# Patient Record
Sex: MALE | Race: BLACK OR AFRICAN AMERICAN | Employment: UNEMPLOYED | URBAN - METROPOLITAN AREA
[De-identification: names, ages, dates, MRNs, and addresses within clinical notes are randomized per-mention and may not be internally consistent; named-entity substitution may affect disease eponyms.]

---

## 2024-07-18 ENCOUNTER — APPOINTMENT (OUTPATIENT)
Dept: GENERAL RADIOLOGY | Age: 39
DRG: 069 | End: 2024-07-18
Payer: COMMERCIAL

## 2024-07-18 ENCOUNTER — HOSPITAL ENCOUNTER (INPATIENT)
Age: 39
LOS: 1 days | Discharge: HOME | DRG: 069 | End: 2024-07-19
Attending: INTERNAL MEDICINE | Admitting: INTERNAL MEDICINE
Payer: COMMERCIAL

## 2024-07-18 ENCOUNTER — APPOINTMENT (OUTPATIENT)
Dept: CT IMAGING | Age: 39
DRG: 069 | End: 2024-07-18
Payer: COMMERCIAL

## 2024-07-18 ENCOUNTER — APPOINTMENT (OUTPATIENT)
Dept: MRI IMAGING | Age: 39
DRG: 069 | End: 2024-07-18
Payer: COMMERCIAL

## 2024-07-18 DIAGNOSIS — R06.02 SHORTNESS OF BREATH: ICD-10-CM

## 2024-07-18 DIAGNOSIS — R07.9 CHEST PAIN, UNSPECIFIED TYPE: ICD-10-CM

## 2024-07-18 DIAGNOSIS — R42 DIZZINESS: Primary | ICD-10-CM

## 2024-07-18 DIAGNOSIS — R29.6 FREQUENT FALLS: ICD-10-CM

## 2024-07-18 PROBLEM — E87.6 HYPOKALEMIA: Status: ACTIVE | Noted: 2024-07-18

## 2024-07-18 PROBLEM — G45.9 TIA (TRANSIENT ISCHEMIC ATTACK): Status: ACTIVE | Noted: 2024-07-18

## 2024-07-18 PROBLEM — I10 HTN (HYPERTENSION): Status: ACTIVE | Noted: 2024-07-18

## 2024-07-18 LAB
ALBUMIN SERPL-MCNC: 4 G/DL (ref 3.4–5)
ALBUMIN/GLOB SERPL: 1.1 {RATIO} (ref 1.1–2.2)
ALP SERPL-CCNC: 89 U/L (ref 40–129)
ALT SERPL-CCNC: 61 U/L (ref 10–40)
ANION GAP SERPL CALCULATED.3IONS-SCNC: 13 MMOL/L (ref 3–16)
AST SERPL-CCNC: 37 U/L (ref 15–37)
BACTERIA URNS QL MICRO: NORMAL /HPF
BASOPHILS # BLD: 0 K/UL (ref 0–0.2)
BASOPHILS NFR BLD: 0.9 %
BILIRUB SERPL-MCNC: 0.3 MG/DL (ref 0–1)
BILIRUB UR QL STRIP.AUTO: NEGATIVE
BUN SERPL-MCNC: 10 MG/DL (ref 7–20)
CALCIUM SERPL-MCNC: 9 MG/DL (ref 8.3–10.6)
CHLORIDE SERPL-SCNC: 99 MMOL/L (ref 99–110)
CLARITY UR: CLEAR
CO2 SERPL-SCNC: 25 MMOL/L (ref 21–32)
COLOR UR: YELLOW
CREAT SERPL-MCNC: 0.8 MG/DL (ref 0.9–1.3)
D-DIMER QUANTITATIVE: <0.27 UG/ML FEU (ref 0–0.6)
DEPRECATED RDW RBC AUTO: 14.4 % (ref 12.4–15.4)
EKG ATRIAL RATE: 79 BPM
EKG DIAGNOSIS: NORMAL
EKG P AXIS: 35 DEGREES
EKG P-R INTERVAL: 164 MS
EKG Q-T INTERVAL: 388 MS
EKG QRS DURATION: 100 MS
EKG QTC CALCULATION (BAZETT): 444 MS
EKG R AXIS: -4 DEGREES
EKG T AXIS: 16 DEGREES
EKG VENTRICULAR RATE: 79 BPM
EOSINOPHIL # BLD: 0.1 K/UL (ref 0–0.6)
EOSINOPHIL NFR BLD: 1.3 %
EPI CELLS #/AREA URNS AUTO: 0 /HPF (ref 0–5)
GFR SERPLBLD CREATININE-BSD FMLA CKD-EPI: >90 ML/MIN/{1.73_M2}
GLUCOSE SERPL-MCNC: 91 MG/DL (ref 70–99)
GLUCOSE UR STRIP.AUTO-MCNC: NEGATIVE MG/DL
HCT VFR BLD AUTO: 44.1 % (ref 40.5–52.5)
HGB BLD-MCNC: 14.2 G/DL (ref 13.5–17.5)
HGB UR QL STRIP.AUTO: NEGATIVE
HYALINE CASTS #/AREA URNS AUTO: 0 /LPF (ref 0–8)
KETONES UR STRIP.AUTO-MCNC: NEGATIVE MG/DL
LEUKOCYTE ESTERASE UR QL STRIP.AUTO: NEGATIVE
LYMPHOCYTES # BLD: 1.3 K/UL (ref 1–5.1)
LYMPHOCYTES NFR BLD: 24.7 %
MCH RBC QN AUTO: 27.9 PG (ref 26–34)
MCHC RBC AUTO-ENTMCNC: 32.3 G/DL (ref 31–36)
MCV RBC AUTO: 86.3 FL (ref 80–100)
MONOCYTES # BLD: 0.6 K/UL (ref 0–1.3)
MONOCYTES NFR BLD: 12.1 %
NEUTROPHILS # BLD: 3.1 K/UL (ref 1.7–7.7)
NEUTROPHILS NFR BLD: 61 %
NITRITE UR QL STRIP.AUTO: NEGATIVE
NT-PROBNP SERPL-MCNC: <36 PG/ML (ref 0–124)
PH UR STRIP.AUTO: 7.5 [PH] (ref 5–8)
PLATELET # BLD AUTO: 241 K/UL (ref 135–450)
PMV BLD AUTO: 8.3 FL (ref 5–10.5)
POTASSIUM SERPL-SCNC: 3.4 MMOL/L (ref 3.5–5.1)
PROT SERPL-MCNC: 7.7 G/DL (ref 6.4–8.2)
PROT UR STRIP.AUTO-MCNC: ABNORMAL MG/DL
RBC # BLD AUTO: 5.11 M/UL (ref 4.2–5.9)
RBC CLUMPS #/AREA URNS AUTO: 2 /HPF (ref 0–4)
SODIUM SERPL-SCNC: 137 MMOL/L (ref 136–145)
SP GR UR STRIP.AUTO: >=1.03 (ref 1–1.03)
TROPONIN, HIGH SENSITIVITY: <6 NG/L (ref 0–22)
TROPONIN, HIGH SENSITIVITY: <6 NG/L (ref 0–22)
UA COMPLETE W REFLEX CULTURE PNL UR: ABNORMAL
UA DIPSTICK W REFLEX MICRO PNL UR: YES
URN SPEC COLLECT METH UR: ABNORMAL
UROBILINOGEN UR STRIP-ACNC: 1 E.U./DL
WBC # BLD AUTO: 5.1 K/UL (ref 4–11)
WBC #/AREA URNS AUTO: 0 /HPF (ref 0–5)

## 2024-07-18 PROCEDURE — 99285 EMERGENCY DEPT VISIT HI MDM: CPT

## 2024-07-18 PROCEDURE — 80053 COMPREHEN METABOLIC PANEL: CPT

## 2024-07-18 PROCEDURE — 85025 COMPLETE CBC W/AUTO DIFF WBC: CPT

## 2024-07-18 PROCEDURE — 93010 ELECTROCARDIOGRAM REPORT: CPT | Performed by: INTERNAL MEDICINE

## 2024-07-18 PROCEDURE — 84484 ASSAY OF TROPONIN QUANT: CPT

## 2024-07-18 PROCEDURE — G0378 HOSPITAL OBSERVATION PER HR: HCPCS

## 2024-07-18 PROCEDURE — 6370000000 HC RX 637 (ALT 250 FOR IP): Performed by: INTERNAL MEDICINE

## 2024-07-18 PROCEDURE — 70496 CT ANGIOGRAPHY HEAD: CPT

## 2024-07-18 PROCEDURE — 70498 CT ANGIOGRAPHY NECK: CPT

## 2024-07-18 PROCEDURE — 2140000000 HC CCU INTERMEDIATE R&B

## 2024-07-18 PROCEDURE — 70551 MRI BRAIN STEM W/O DYE: CPT

## 2024-07-18 PROCEDURE — 71045 X-RAY EXAM CHEST 1 VIEW: CPT

## 2024-07-18 PROCEDURE — 85379 FIBRIN DEGRADATION QUANT: CPT

## 2024-07-18 PROCEDURE — 93005 ELECTROCARDIOGRAM TRACING: CPT | Performed by: INTERNAL MEDICINE

## 2024-07-18 PROCEDURE — 81001 URINALYSIS AUTO W/SCOPE: CPT

## 2024-07-18 PROCEDURE — 70450 CT HEAD/BRAIN W/O DYE: CPT

## 2024-07-18 PROCEDURE — 2580000003 HC RX 258: Performed by: INTERNAL MEDICINE

## 2024-07-18 PROCEDURE — 6360000004 HC RX CONTRAST MEDICATION: Performed by: PHYSICIAN ASSISTANT

## 2024-07-18 PROCEDURE — 83880 ASSAY OF NATRIURETIC PEPTIDE: CPT

## 2024-07-18 RX ORDER — 0.9 % SODIUM CHLORIDE 0.9 %
500 INTRAVENOUS SOLUTION INTRAVENOUS PRN
Status: DISCONTINUED | OUTPATIENT
Start: 2024-07-18 | End: 2024-07-19 | Stop reason: HOSPADM

## 2024-07-18 RX ORDER — LOSARTAN POTASSIUM 25 MG/1
25 TABLET ORAL DAILY
Status: DISCONTINUED | OUTPATIENT
Start: 2024-07-19 | End: 2024-07-19 | Stop reason: HOSPADM

## 2024-07-18 RX ORDER — SODIUM CHLORIDE 9 MG/ML
INJECTION, SOLUTION INTRAVENOUS PRN
Status: DISCONTINUED | OUTPATIENT
Start: 2024-07-18 | End: 2024-07-19 | Stop reason: HOSPADM

## 2024-07-18 RX ORDER — ENOXAPARIN SODIUM 100 MG/ML
40 INJECTION SUBCUTANEOUS DAILY
Status: DISCONTINUED | OUTPATIENT
Start: 2024-07-19 | End: 2024-07-19 | Stop reason: HOSPADM

## 2024-07-18 RX ORDER — ASPIRIN 81 MG/1
81 TABLET, CHEWABLE ORAL DAILY
Status: DISCONTINUED | OUTPATIENT
Start: 2024-07-18 | End: 2024-07-19 | Stop reason: HOSPADM

## 2024-07-18 RX ORDER — ONDANSETRON 2 MG/ML
4 INJECTION INTRAMUSCULAR; INTRAVENOUS EVERY 6 HOURS PRN
Status: DISCONTINUED | OUTPATIENT
Start: 2024-07-18 | End: 2024-07-19 | Stop reason: HOSPADM

## 2024-07-18 RX ORDER — ONDANSETRON 4 MG/1
4 TABLET, ORALLY DISINTEGRATING ORAL EVERY 8 HOURS PRN
Status: DISCONTINUED | OUTPATIENT
Start: 2024-07-18 | End: 2024-07-19 | Stop reason: HOSPADM

## 2024-07-18 RX ORDER — POTASSIUM CHLORIDE 20 MEQ/1
40 TABLET, EXTENDED RELEASE ORAL PRN
Status: DISCONTINUED | OUTPATIENT
Start: 2024-07-18 | End: 2024-07-19 | Stop reason: HOSPADM

## 2024-07-18 RX ORDER — POTASSIUM CHLORIDE 7.45 MG/ML
10 INJECTION INTRAVENOUS PRN
Status: DISCONTINUED | OUTPATIENT
Start: 2024-07-18 | End: 2024-07-19 | Stop reason: HOSPADM

## 2024-07-18 RX ORDER — SODIUM CHLORIDE 0.9 % (FLUSH) 0.9 %
10 SYRINGE (ML) INJECTION EVERY 12 HOURS SCHEDULED
Status: DISCONTINUED | OUTPATIENT
Start: 2024-07-18 | End: 2024-07-19 | Stop reason: HOSPADM

## 2024-07-18 RX ORDER — HYDRALAZINE HYDROCHLORIDE 20 MG/ML
10 INJECTION INTRAMUSCULAR; INTRAVENOUS EVERY 6 HOURS PRN
Status: DISCONTINUED | OUTPATIENT
Start: 2024-07-18 | End: 2024-07-19 | Stop reason: HOSPADM

## 2024-07-18 RX ORDER — LOSARTAN POTASSIUM 25 MG/1
25 TABLET ORAL DAILY
COMMUNITY

## 2024-07-18 RX ORDER — ASPIRIN 300 MG/1
300 SUPPOSITORY RECTAL DAILY
Status: DISCONTINUED | OUTPATIENT
Start: 2024-07-18 | End: 2024-07-19 | Stop reason: HOSPADM

## 2024-07-18 RX ORDER — SODIUM CHLORIDE 0.9 % (FLUSH) 0.9 %
10 SYRINGE (ML) INJECTION PRN
Status: DISCONTINUED | OUTPATIENT
Start: 2024-07-18 | End: 2024-07-19 | Stop reason: HOSPADM

## 2024-07-18 RX ADMIN — IOPAMIDOL 75 ML: 755 INJECTION, SOLUTION INTRAVENOUS at 10:10

## 2024-07-18 RX ADMIN — SODIUM CHLORIDE, PRESERVATIVE FREE 10 ML: 5 INJECTION INTRAVENOUS at 20:34

## 2024-07-18 RX ADMIN — ASPIRIN 81 MG 81 MG: 81 TABLET ORAL at 20:33

## 2024-07-18 ASSESSMENT — ENCOUNTER SYMPTOMS
ABDOMINAL PAIN: 0
COUGH: 0
DIARRHEA: 0
CHEST TIGHTNESS: 0
VOMITING: 0
NAUSEA: 0
WHEEZING: 0
SHORTNESS OF BREATH: 1

## 2024-07-18 ASSESSMENT — LIFESTYLE VARIABLES
HOW OFTEN DO YOU HAVE A DRINK CONTAINING ALCOHOL: NEVER
HOW MANY STANDARD DRINKS CONTAINING ALCOHOL DO YOU HAVE ON A TYPICAL DAY: PATIENT DOES NOT DRINK

## 2024-07-18 ASSESSMENT — PAIN - FUNCTIONAL ASSESSMENT: PAIN_FUNCTIONAL_ASSESSMENT: NONE - DENIES PAIN

## 2024-07-18 NOTE — PROGRESS NOTES
Patient in bed with bed in lowest position, hooked up to CVU monitoring call light and belongings within reach.  Patient education folder given and reviewed.  Safety protocols and unit activities explained to patient/family.  No further questions or needs stated at this time.  Instructed to call with any needs.

## 2024-07-18 NOTE — ED NOTES
ED TO INPATIENT SBAR HANDOFF    Patient Name: Khoi Calderon   :  1985  38 y.o.   MRN:  [id number]  Preferred Name  Khoi   ED Room #:  ED-0011/11  Family/Caregiver Present yes   Restraints no   Sitter no   Sepsis Risk Score      Situation  Code Status: No Order No additional code details.    Allergies: Patient has no known allergies.  Weight: Patient Vitals for the past 96 hrs (Last 3 readings):   Weight   24 0752 76.7 kg (169 lb)     Arrived from: home  Chief Complaint:   Chief Complaint   Patient presents with    Dizziness     [facility] from home due to dizziness that started this morning when he woke up. Also states he fell 3 times this morning, but no LOC, did hit head but not any blood thinners. Pt lives with parents     Hospital Problem/Diagnosis:  Principal Problem:    TIA (transient ischemic attack)  Active Problems:    HTN (hypertension)    Hypokalemia    Dizziness  Resolved Problems:    * No resolved hospital problems. *    Imaging:   CTA HEAD NECK W CONTRAST   Final Result   1. No flow limiting stenosis or dissection of the cervical carotid/vertebral   arteries.   2. No significant stenosis or large vessel occlusion of the ighniv-ib-Cgdknn.         CT HEAD WO CONTRAST   Final Result   No acute intracranial abnormality.         XR CHEST PORTABLE   Final Result   No acute cardiopulmonary process.      No change compared to the prior study.           Abnormal labs:   Abnormal Labs Reviewed   COMPREHENSIVE METABOLIC PANEL - Abnormal; Notable for the following components:       Result Value    Potassium 3.4 (*)     Creatinine 0.8 (*)     ALT 61 (*)     All other components within normal limits     Critical values: no     Abnormal Assessment Findings:     Background  History:   Past Medical History:   Diagnosis Date    Retardation        Assessment    Vitals/MEWS: MEWS Score: 1  Level of Consciousness: Alert (0)   Vitals:    24 0814 24 1047 24 1102 24 1130   BP: 125/81

## 2024-07-18 NOTE — ED PROVIDER NOTES
[facility] EMERGENCY DEPARTMENT  EMERGENCY DEPARTMENT ENCOUNTER        Pt Name: Khoi Calderon  MRN: [id number]  Birthdate 1985  Date of evaluation: 7/18/2024  Provider: Dane Abreu PA-C  PCP: Alexis Phoenix MD  Note Started: 7:54 AM EDT 7/18/24      ODALYS. I have evaluated this patient.        CHIEF COMPLAINT       Chief Complaint   Patient presents with    Dizziness     [facility] from home due to dizziness that started this morning when he woke up. Also states he fell 3 times this morning, but no LOC, did hit head but not any blood thinners. Pt lives with parents       HISTORY OF PRESENT ILLNESS: 1 or more Elements     History from : Patient    Limitations to history : None    Khoi Calderon is a 38 y.o. male with a history of mental retardation who presents to the emergency department today with several different complaints.  Patient is here with his mother.  He states he woke up this morning around 7 AM with chest pain, shortness of breath and dizziness.  Patient describes the chest pain as a sharp, constant, 8/10 pain that localizes to his left chest and radiates to his left back.  He denies heart palpitations or diaphoresis.  He states he got up out of bed to go to the bathroom and actually fell to the ground secondary to the dizziness.  He describes the dizziness as a feeling of being off balance.  He does believe he hit his head but denies losing consciousness.  He states he went to the bathroom to urinate and on his way back to his bedroom he fell again.  He denies losing consciousness during this fall.  He states he went downstairs and became dizzy and fell again in his living room.  He denies losing consciousness during this fall.  He denies headache.  He denies having any lightheadedness or dizziness at this time.  He denies vision changes, numbness, tingling or weakness in his extremities.  He states he is no longer having chest pain or shortness of breath.  He denies  Capillary Refill: Capillary refill takes less than 2 seconds.      Coloration: Skin is not pale.   Neurological:      Mental Status: He is alert and oriented to person, place, and time.      GCS: GCS eye subscore is 4. GCS verbal subscore is 5. GCS motor subscore is 6.      Cranial Nerves: Cranial nerves 2-12 are intact.      Sensory: Sensation is intact.      Motor: Motor function is intact.      Coordination: Coordination is intact.      Gait: Gait is intact.      Comments: Negative test of skew  Patient ambulates without abnormal coordination or gait   Psychiatric:         Behavior: Behavior normal.             DIAGNOSTIC RESULTS   LABS:    Labs Reviewed   COMPREHENSIVE METABOLIC PANEL - Abnormal; Notable for the following components:       Result Value    Potassium 3.4 (*)     Creatinine 0.8 (*)     ALT 61 (*)     All other components within normal limits   CBC WITH AUTO DIFFERENTIAL   TROPONIN   TROPONIN   BRAIN NATRIURETIC PEPTIDE   D-DIMER, QUANTITATIVE   URINALYSIS WITH REFLEX TO CULTURE       When ordered only abnormal lab results are displayed. All other labs were within normal range or not returned as of this dictation.    EKG: When ordered, EKG's are interpreted by the Emergency Department Physician in the absence of a cardiologist.  Please see their note for interpretation of EKG.    RADIOLOGY:   Non-plain film images such as CT, Ultrasound and MRI are read by the radiologist. Plain radiographic images are visualized and preliminarily interpreted by the ED Provider with the below findings:        Interpretation per the Radiologist below, if available at the time of this note:    CTA HEAD NECK W CONTRAST   Final Result   1. No flow limiting stenosis or dissection of the cervical carotid/vertebral   arteries.   2. No significant stenosis or large vessel occlusion of the rzubig-ip-Uzqtlh.         CT HEAD WO CONTRAST   Final Result   No acute intracranial abnormality.         XR CHEST PORTABLE   Final  Result   No acute cardiopulmonary process.      No change compared to the prior study.               PROCEDURES   Unless otherwise noted below, none     Procedures    CRITICAL CARE TIME (.cctime)       PAST MEDICAL HISTORY      has a past medical history of Retardation.     Chronic Conditions affecting Care: None    EMERGENCY DEPARTMENT COURSE and DIFFERENTIAL DIAGNOSIS/MDM:   Vitals:    Vitals:    07/18/24 0814 07/18/24 1047 07/18/24 1102 07/18/24 1130   BP: 125/81 117/72 115/75 118/76   Pulse: 82 75 76 76   Resp: 20 20 12 16   Temp:       TempSrc:       SpO2: 100% 98% 98% 96%   Weight:           Patient was given the following medications:  Medications   iopamidol (ISOVUE-370) 76 % injection 75 mL (75 mLs IntraVENous Given 7/18/24 1010)             Is this patient to be included in the SEP-1 Core Measure due to severe sepsis or septic shock?   No   Exclusion criteria - the patient is NOT to be included for SEP-1 Core Measure due to:  Infection is not suspected    CONSULTS: (Who and What was discussed)  None  Discussion with Other Profesionals : None    Social Determinants : None    Records Reviewed : None    CC/HPI Summary, DDx, ED Course, and Reassessment: Patient with a history of mental retardation presented to the emergency department today with several different complaints.  Patient is here with his mother.  He states he woke up this morning around 7 AM with chest pain, shortness of breath and dizziness.  Patient describes the chest pain as a sharp, constant, 8/10 pain that localizes to his left chest and radiates to his left back.  He denies heart palpitations or diaphoresis.  He states he got up out of bed to go to the bathroom and actually fell to the ground secondary to the dizziness.  He describes the dizziness as a feeling of being off balance.  He does believe he hit his head but denies losing consciousness.  He states he went to the bathroom to urinate and on his way back to his bedroom he fell again.

## 2024-07-18 NOTE — PROGRESS NOTES
Pt admitted for tia    Full h+p to follow    Active Hospital Problems    Diagnosis Date Noted    TIA (transient ischemic attack) [G45.9] 07/18/2024    HTN (hypertension) [I10] 07/18/2024    Hypokalemia [E87.6] 07/18/2024    Dizziness [R42] 07/18/2024       Please use [employer] to contact me with any questions during the day.   The hospitalist service will provide cross-coverage for this patient from 7pm to 7am.    During those hours, contact the on-call hospitalist MD/ODALYS for questions.

## 2024-07-18 NOTE — PROGRESS NOTES
Patient seen in ED, room 11.  Admission completed with the following exceptions:  4 Eyes Assessment, Immunizations, Vaccines, Rights and Responsibilities, Orientation to room, Plan of Care, Education/Learning Assessment and Education Plan, white board, height and weight, pain assessment and head to toe assessment.  Patient is alert and oriented X 4.  Patient lives in a house with his parents and is being admitted for TIA.  Home Medications as well as Outside Sources have been verbally reviewed with patient and updated if appropriate.  Medication reconciliation is now Completed.  All questions answered. Mother is at bedside.      Patient lives with his parents.  Fall risk bracelet applied as patient has 3 falls at home.

## 2024-07-18 NOTE — H&P
History and Physical  Dr. Chauhan  7/18/2024    PCP: Alexis Phoenix MD    Cc:   Chief Complaint   Patient presents with    Dizziness     [facility] from home due to dizziness that started this morning when he woke up. Also states he fell 3 times this morning, but no LOC, did hit head but not any blood thinners. Pt lives with parents       HPI:  Khoi Calderon is a 38 y.o. male who has a past medical history of Retardation.     Patient presents with TIA (transient ischemic attack).  HPI  (1-3 for expanded problem focused, ?4 for detailed/comprehensive)     38 y.o. male with a history of mental retardation who presents to the emergency department today with several different complaints.  Patient is here with his mother.  He states he woke up this morning around 7 AM with chest pain, shortness of breath and dizziness.  Patient describes the chest pain as a sharp, constant, 8/10 pain that localizes to his left chest and radiates to his left back.  He denies heart palpitations or diaphoresis.  He states he got up out of bed to go to the bathroom and actually fell to the ground secondary to the dizziness.  He describes the dizziness as a feeling of being off balance.  He does believe he hit his head but denies losing consciousness.  He states he went to the bathroom to urinate and on his way back to his bedroom he fell again.  He denies losing consciousness during this fall.  He states he went downstairs and became dizzy and fell again in his living room.  He denies losing consciousness during this fall.  He denies headache.  He denies having any lightheadedness or dizziness at this time.  He denies vision changes, numbness, tingling or weakness in his extremities.  He states he is no longer having chest pain or shortness of breath.  He denies ever having abdominal pain or nausea or vomiting.  Patient is alert and oriented x 3 with GCS 15 on arrival.     Imaging from ER reviewed by self  CTA HEAD NECK W CONTRAST   Final

## 2024-07-19 VITALS
TEMPERATURE: 98.1 F | BODY MASS INDEX: 27.4 KG/M2 | DIASTOLIC BLOOD PRESSURE: 91 MMHG | HEIGHT: 68 IN | WEIGHT: 180.78 LBS | OXYGEN SATURATION: 97 % | RESPIRATION RATE: 18 BRPM | HEART RATE: 75 BPM | SYSTOLIC BLOOD PRESSURE: 133 MMHG

## 2024-07-19 LAB
ANION GAP SERPL CALCULATED.3IONS-SCNC: 10 MMOL/L (ref 3–16)
BASOPHILS # BLD: 0.1 K/UL (ref 0–0.2)
BASOPHILS NFR BLD: 1.1 %
BUN SERPL-MCNC: 10 MG/DL (ref 7–20)
CALCIUM SERPL-MCNC: 8.6 MG/DL (ref 8.3–10.6)
CHLORIDE SERPL-SCNC: 103 MMOL/L (ref 99–110)
CHOLEST SERPL-MCNC: 182 MG/DL (ref 0–199)
CO2 SERPL-SCNC: 26 MMOL/L (ref 21–32)
CREAT SERPL-MCNC: 0.7 MG/DL (ref 0.9–1.3)
DEPRECATED RDW RBC AUTO: 14.5 % (ref 12.4–15.4)
EOSINOPHIL # BLD: 0.1 K/UL (ref 0–0.6)
EOSINOPHIL NFR BLD: 1.1 %
EST. AVERAGE GLUCOSE BLD GHB EST-MCNC: 96.8 MG/DL
GFR SERPLBLD CREATININE-BSD FMLA CKD-EPI: >90 ML/MIN/{1.73_M2}
GLUCOSE SERPL-MCNC: 94 MG/DL (ref 70–99)
HBA1C MFR BLD: 5 %
HCT VFR BLD AUTO: 41 % (ref 40.5–52.5)
HDLC SERPL-MCNC: 46 MG/DL (ref 40–60)
HGB BLD-MCNC: 13.9 G/DL (ref 13.5–17.5)
LDLC SERPL CALC-MCNC: 114 MG/DL
LYMPHOCYTES # BLD: 1.6 K/UL (ref 1–5.1)
LYMPHOCYTES NFR BLD: 27.4 %
MCH RBC QN AUTO: 28.8 PG (ref 26–34)
MCHC RBC AUTO-ENTMCNC: 33.8 G/DL (ref 31–36)
MCV RBC AUTO: 85.3 FL (ref 80–100)
MONOCYTES # BLD: 0.6 K/UL (ref 0–1.3)
MONOCYTES NFR BLD: 10 %
NEUTROPHILS # BLD: 3.6 K/UL (ref 1.7–7.7)
NEUTROPHILS NFR BLD: 60.4 %
PLATELET # BLD AUTO: 237 K/UL (ref 135–450)
PMV BLD AUTO: 8.2 FL (ref 5–10.5)
POTASSIUM SERPL-SCNC: 3.4 MMOL/L (ref 3.5–5.1)
RBC # BLD AUTO: 4.8 M/UL (ref 4.2–5.9)
SODIUM SERPL-SCNC: 139 MMOL/L (ref 136–145)
TRIGL SERPL-MCNC: 111 MG/DL (ref 0–150)
VLDLC SERPL CALC-MCNC: 22 MG/DL
WBC # BLD AUTO: 5.9 K/UL (ref 4–11)

## 2024-07-19 PROCEDURE — G0378 HOSPITAL OBSERVATION PER HR: HCPCS

## 2024-07-19 PROCEDURE — 97530 THERAPEUTIC ACTIVITIES: CPT

## 2024-07-19 PROCEDURE — 83036 HEMOGLOBIN GLYCOSYLATED A1C: CPT

## 2024-07-19 PROCEDURE — 80048 BASIC METABOLIC PNL TOTAL CA: CPT

## 2024-07-19 PROCEDURE — 80061 LIPID PANEL: CPT

## 2024-07-19 PROCEDURE — 97165 OT EVAL LOW COMPLEX 30 MIN: CPT

## 2024-07-19 PROCEDURE — 97161 PT EVAL LOW COMPLEX 20 MIN: CPT

## 2024-07-19 PROCEDURE — 6370000000 HC RX 637 (ALT 250 FOR IP): Performed by: INTERNAL MEDICINE

## 2024-07-19 PROCEDURE — 6360000002 HC RX W HCPCS: Performed by: INTERNAL MEDICINE

## 2024-07-19 PROCEDURE — 85025 COMPLETE CBC W/AUTO DIFF WBC: CPT

## 2024-07-19 PROCEDURE — 96372 THER/PROPH/DIAG INJ SC/IM: CPT

## 2024-07-19 PROCEDURE — 2580000003 HC RX 258: Performed by: INTERNAL MEDICINE

## 2024-07-19 RX ADMIN — ASPIRIN 81 MG 81 MG: 81 TABLET ORAL at 09:07

## 2024-07-19 RX ADMIN — SODIUM CHLORIDE, PRESERVATIVE FREE 10 ML: 5 INJECTION INTRAVENOUS at 09:08

## 2024-07-19 RX ADMIN — ENOXAPARIN SODIUM 40 MG: 100 INJECTION SUBCUTANEOUS at 09:07

## 2024-07-19 RX ADMIN — LOSARTAN POTASSIUM 25 MG: 25 TABLET, FILM COATED ORAL at 09:07

## 2024-07-19 NOTE — PROGRESS NOTES
[facility] - Inpatient Rehabilitation Department   Phone: [phone]    Occupational Therapy    [x] Initial Evaluation            [] Daily Treatment Note         [x] Discharge Summary      Patient: Khoi Calderon   : 1985   MRN: [id number]   Date of Service:  2024    Admitting Diagnosis:  TIA (transient ischemic attack)  Current Admission Summary:  38 y.o. male with a history of mental retardation who presents to the emergency department today with several different complaints.  Patient is here with his mother.  He states he woke up this morning around 7 AM with chest pain, shortness of breath and dizziness.  Patient describes the chest pain as a sharp, constant, 8/10 pain that localizes to his left chest and radiates to his left back.  He denies heart palpitations or diaphoresis.  He states he got up out of bed to go to the bathroom and actually fell to the ground secondary to the dizziness.  He describes the dizziness as a feeling of being off balance.  He does believe he hit his head but denies losing consciousness.  He states he went to the bathroom to urinate and on his way back to his bedroom he fell again.  He denies losing consciousness during this fall.  He states he went downstairs and became dizzy and fell again in his living room.  He denies losing consciousness during this fall.  He denies headache.  He denies having any lightheadedness or dizziness at this time.  He denies vision changes, numbness, tingling or weakness in his extremities.  He states he is no longer having chest pain or shortness of breath.  He denies ever having abdominal pain or nausea or vomiting.  Patient is alert and oriented x 3 with GCS 15 on arrival.   Past Medical History:  has a past medical history of Hypertension and mental Retardation.  Past Surgical History:  has a past surgical history that includes other surgical history (Right).    Discharge Recommendations: Khoi Calderon scored a 24/24  on the AM-[facility] ADL Inpatient form.  At this time, no further OT is recommended upon discharge due to patient at independent level.  Recommend patient returns to prior setting with prior services.      DME Required For Discharge: No DME required    Precautions/Restrictions: low fall risk  Positional Restrictions:no positional restrictions    Pre-Admission Information   Lives With: family                    Type of Home: house  Home Layout: two level, basement  Home Access:  2 step to enter with handrail.  Bathroom Layout: walk in shower  Bathroom Equipment:  no equipment   Toilet Height: standard height  Home Equipment:  no equipment   Transfer Assistance: Independent without use of device  Ambulation Assistance:Independent without use of device  ADL Assistance: independent with all ADL's  IADL Assistance: independent with homemaking tasks  Active :        [] Yes                 [x] No  Hand Dominance: [x] Left                 [] Right  Current Employment: full time employment.  Occupation: cleaning  Hobbies: ride bikes   Recent Falls: 3 falls because of dizziness-reason for admission     Examination   Vision:   Vision Gross Assessment: WFL  Hearing:   WFL    Posture:   good  Sensation:   denies numbness and tingling  Coordination WFL  ROM:   (B) UE ROM WFL  Strength:   (B) UE gross strength WFL    Therapist Clinical Decision Making (Complexity): low complexity  Clinical Presentation: stable      Subjective  General: patient supine in bed on arrival, agreeable to therapy evaluation  Pain: 0/10  Pain Interventions: not applicable        Activities of Daily Living  Basic Activities of Daily Living  Grooming: Independent oral care in stance at sink  Lower Extremity Dressing: Independent don socks seated EOB  Instrumental Activities of Daily Living  No IADL completed on this date.    Functional Mobility  Bed Mobility  Supine to Sit: Independent  Sit to Supine: Independent  Comments:  Transfers  Sit to stand  transfer:Independent  Stand to sit transfer: Independent  Comments:  Functional Mobility  Functional Mobility Activity: 300ft  Device Use: no device  Required Assistance: Independent  Comment: no LOB or unsteadiness noted, pt completes head turns, walking fast and stopping suddenly, no LOB noted  Balance:  Static Sitting Balance: good: independent with functional balance in unsupported position  Dynamic Sitting Balance: good: independent with functional balance in unsupported position  Static Standing Balance: good: independent with functional balance in unsupported position  Dynamic Standing Balance: good: independent with functional balance in unsupported position  Comments:    Other Therapeutic Interventions    Functional Outcomes  AM-PAC Inpatient Daily Activity Raw Score: 24    Cognition  WFL for basic activity, pt with h/o MRDD  Orientation:    A&O x 4  Command Following:   WFL     Education  Barriers To Learning: cognition  Patient Education: Patient educated on OT role and benefits, plan of care, transfer training, discharge recommendations  Learning Assessment:  Patient verbalized and demonstrates understanding    Assessment  Activity Tolerance: good  Impairments Requiring Therapeutic Intervention: none - eval with same day discharge  Prognosis: good without need for therapy intervention  Clinical Assessment: Patient presenting at independent level for completion of required self care tasks for return to home.  Eval with d/c at this time.  No therapy services indicated.   Safety Interventions: patient left in bed, call light within reach, gait belt, and nurse notified    Plan  Frequency: Eval with same day discharge.  No follow up required.  Current Treatment Recommendations: Not applicable, evaluation completed with same day discharge.    Goals  Patient eval with same day discharge.  No goals set as patient is at baseline self care status.      Therapy Session Time     Individual Group Co-treatment   Time

## 2024-07-19 NOTE — PROGRESS NOTES
[facility] - Inpatient Rehabilitation Department   Phone: [phone]    Physical Therapy    [x] Initial Evaluation            [] Daily Treatment Note         [x] Discharge Summary      Patient: Khoi Calderon   : 1985   MRN: [id number]   Date of Service:  2024  Admitting Diagnosis: TIA (transient ischemic attack)  Current Admission Summary: Khoi Calderon is a 38 y.o. male with a history of mental retardation who presents to the emergency department today with several different complaints. Patient is here with his mother. He states he woke up this morning around 7 AM with chest pain, shortness of breath and dizziness. Patient describes the chest pain as a sharp, constant, 8/10 pain that localizes to his left chest and radiates to his left back. He denies heart palpitations or diaphoresis. He states he got up out of bed to go to the bathroom and actually fell to the ground secondary to the dizziness. He describes the dizziness as a feeling of being off balance. He does believe he hit his head but denies losing consciousness. He states he went to the bathroom to urinate and on his way back to his bedroom he fell again. He denies losing consciousness during this fall. He states he went downstairs and became dizzy and fell again in his living room. He denies losing consciousness during this fall. He denies headache. He denies having any lightheadedness or dizziness at this time. He denies vision changes, numbness, tingling or weakness in his extremities. He states he is no longer having chest pain or shortness of breath. He denies ever having abdominal pain or nausea or vomiting. Patient is alert and oriented x 3 with GCS 15 on arrival. He has a normal neurologic exam. NIH is 0.   Past Medical History:  has a past medical history of Hypertension and mental Retardation.  Past Surgical History:  has a past surgical history that includes other surgical history (Right).  Discharge  no device  Assistance: Independent  Distance: 270' + 10'  Gait Mechanics: appropriate caitlyn, appropriate step height/length  Comments:  able to perform multiple head turns with distractions within environment and maintain balance; increased speed then suddenly stopped with no LOB  Stair Mobility  Stair mobility not completed on this date.  Comments:  Balance  Static Sitting Balance: good: independent with functional balance in unsupported position  Dynamic Sitting Balance: good: independent with functional balance in unsupported position  Static Standing Balance: good: independent with functional balance in unsupported position  Dynamic Standing Balance: good: independent with functional balance in unsupported position  Comments: no LOB, multiple head turns and flexing forward while at sink without support of UE    Other Therapeutic Interventions    Functional Outcomes  -PAC Inpatient Mobility Raw Score : 24              Cognition  WFL  Orientation:    A&O x 4    Education  Barriers To Learning: cognition  Patient Education: patient educated on goals, PT role and benefits, discharge recommendations  Learning Assessment:  Pt verbalized and demonstrates understanding    Assessment  Activity Tolerance: Patient tolerated well. Patient did not complain of dizziness throughout session, vitals stable throughout   Impairments Requiring Therapeutic Intervention: none - eval with same day discharge  Prognosis: good without need for therapy intervention  Clinical Assessment: Patient presenting at independent level for completion of required mobility tasks for return to home.  Eval with d/c at this time.  No therapy services indicated.    Safety Interventions: patient left in bed, call light within reach, gait belt, and nurse notified    Plan  Frequency: Eval with same day discharge.  No follow up required.  Current Treatment Recommendations: Not applicable, evaluation completed with same day discharge.    Goals  Patient  eval with same day discharge.  No goals set as patient is at baseline mobility status.      Therapy Session Time      Individual Group Co-treatment   Time In     0825   Time Out     0848   Minutes     23     Timed Code Treatment Minutes:  8 Minutes  Total Treatment Minutes:  23 minutes     1 low eval   1 TA  Electronically Signed By: Noreen Monte SPT    Therapist was present, directed the patient's care, made skilled judgement, and was responsible for assessment and treatment of the patient.  Co-signed and supervised by: Laura Wolfe PT, DPT [id number]

## 2024-07-19 NOTE — CARE COORDINATION
07/19/24 1051   IMM Letter   IMM Letter given to Patient/Family/Significant other/Guardian/POA/by: CM provided IMM to pt at bedside, Pt parents present.   IMM Letter date given: 07/19/24   IMM Letter time given: 1052     Electronically signed by Latanya Mays on 7/19/2024 at 10:52 AM

## 2024-07-19 NOTE — PROGRESS NOTES
Speech Language Pathology      [facility]  1985      SLP Eval and Treat orders were received. Per chart review and discussion with RN, Pt does not appear to have any speech therapy needs at this time. Will discontinue order, please re-consult if indicated.     Danii Cassidy M.A., CCC-SLP [id number]  Speech-Language Pathologist

## 2024-07-19 NOTE — DISCHARGE SUMMARY
[facility] -- Physician Discharge Summary     Khoi Calderon  1985  MRN: [id number]    Admit Date: 7/18/2024  Discharge Date: No discharge date for patient encounter.    Attending MD: Callum Chauhan MD  Discharging MD: Callum Chauhan MD  PCP: Alexis Phoenix MD [address] [address] [phone]    Admission Diagnosis: Shortness of breath [R06.02]  TIA (transient ischemic attack) [G45.9]  Dizziness [R42]  Frequent falls [R29.6]  Chest pain, unspecified type [R07.9]  DISCHARGE DIAGNOSIS: same    Full Hospital Problem List:  Active Hospital Problems    Diagnosis Date Noted    TIA (transient ischemic attack) [G45.9] 07/18/2024    HTN (hypertension) [I10] 07/18/2024    Hypokalemia [E87.6] 07/18/2024    Dizziness [R42] 07/18/2024           Hospital Course:  38 y.o. male with a history of mental retardation who presents to the emergency department today with several different complaints.  Patient is here with his mother.  He states he woke up this morning around 7 AM with chest pain, shortness of breath and dizziness.  Patient describes the chest pain as a sharp, constant, 8/10 pain that localizes to his left chest and radiates to his left back.  He denies heart palpitations or diaphoresis.  He states he got up out of bed to go to the bathroom and actually fell to the ground secondary to the dizziness.  He describes the dizziness as a feeling of being off balance.  He does believe he hit his head but denies losing consciousness.  He states he went to the bathroom to urinate and on his way back to his bedroom he fell again.  He denies losing consciousness during this fall.  He states he went downstairs and became dizzy and fell again in his living room.  He denies losing consciousness during this fall.  He denies headache.  He denies having any lightheadedness or dizziness at this time.  He denies vision changes, numbness, tingling or weakness in his extremities.  He states  he is no longer having chest pain or shortness of breath.  He denies ever having abdominal pain or nausea or vomiting.  Patient is alert and oriented x 3 with GCS 15 on arrival.      Imaging from ER reviewed by self  CTA HEAD NECK W CONTRAST   Final Result   1. No flow limiting stenosis or dissection of the cervical carotid/vertebral   arteries.   2. No significant stenosis or large vessel occlusion of the [id number].           CT HEAD WO CONTRAST   Final Result   No acute intracranial abnormality.      EKG completed shows no signs of acute ischemia or infarction.  Initial and delta troponins are negative.  proBNP <36 and D-dimer is negative.  Chest x-ray shows no acute cardiopulmonary disease. CBC, BMP and LFTs are unremarkable.        Pt to be admitted for further eval    MRI brain done  Impression:        No acute infarct.         All sx appear to be resolved      Consults made during Hospitalization:  None    Treatment team at time of Discharge: Treatment Team: Attending Provider: Callum Chauhan MD; Respiratory Therapist (Night): Edwige Pike RCP; Respiratory Therapist (Day): Karen Sullivan RCP; Registered Nurse: Yesenia Caceres RN; Physical Therapist: Laura Wolfe, PT; Speech Language Pathologist: Danii Cassidy, SLP    Imaging Results:  MRI brain without contrast    Result Date: 7/18/2024  EXAMINATION: MRI OF THE BRAIN WITHOUT CONTRAST  7/18/2024 5:34 pm TECHNIQUE: Multiplanar multisequence MRI of the brain was performed without the administration of intravenous contrast. COMPARISON: None. HISTORY: ORDERING SYSTEM PROVIDED HISTORY: TIA TECHNOLOGIST PROVIDED HISTORY: Reason for exam:->TIA Reason for Exam: dizzy, chest pain, falls, high bp 1 FINDINGS: INTRACRANIAL STRUCTURES/VENTRICLES: There is no acute infarct. No mass effect or midline shift. No evidence of an acute intracranial hemorrhage.  The ventricles and sulci are normal in size and configuration.  The sellar/suprasellar regions  with a voice recognition program.  Efforts were made to edit the dictations but occasionally words are mis-transcribed.)      Signed:  Callum Chauhan MD  7/19/2024    Please use [employer] to contact me with any questions during the day.   The hospitalist service will provide cross-coverage for this patient from 7pm to 7am.    During those hours, contact the on-call hospitalist MD/ODALYS for questions.

## 2024-07-19 NOTE — CARE COORDINATION
Discharge Planning:     (CM) reviewed the patient's chart to assess needs.   Pt is here for TIA, dizziness and falls at home.   Patient's Readmission Risk Score is 5%. Patient's medical insurance is [employer]. Patient's PCP is KIERSTEN HUDSON .   Per daily reports [facility] is following Pt.  No needs anticipated, at this time. CM team to follow. Staff to inform CM if additional discharge needs arise.     Electronically signed by Latanya Mays on 7/19/2024 at 8:33 AM

## 2024-07-19 NOTE — PROGRESS NOTES
Data- discharge order received, pt verbalized agreement to discharge, disposition to previous residence, no needs for HHC/DME.     Action- discharge instructions prepared and given to patient, pt verbalized understanding. Medication information packet given r/t NEW and/or CHANGED prescriptions emphasizing name/purpose/side effects, pt verbalized understanding. Discharge instruction summary: Diet- general, Activity- up as tolerated, Primary Care Physician as follows: Alexis Phoenix [phone] f/u appointment to be set up by patient, immunizations reviewed, prescription medications filled n/a.      1. WEIGHT: Admit Weight - Scale: 76.7 kg (169 lb) (07/18/24 0752)        Today  Weight - Scale: 82 kg (180 lb 12.4 oz) (07/19/24 0717)       2. O2 SAT.: SpO2: 97 % (07/19/24 0837)    Response- Pt belongings gathered, IV removed. Disposition is home (no HHC/DME needs), transported with family, taken to lobby via w/c w/ belongings, no complications.